# Patient Record
Sex: FEMALE | Race: ASIAN | Employment: OTHER | URBAN - METROPOLITAN AREA
[De-identification: names, ages, dates, MRNs, and addresses within clinical notes are randomized per-mention and may not be internally consistent; named-entity substitution may affect disease eponyms.]

---

## 2022-05-09 ENCOUNTER — HOSPITAL ENCOUNTER (OUTPATIENT)
Age: 72
Discharge: HOME | End: 2022-05-09
Payer: MEDICAID

## 2022-05-09 ENCOUNTER — HOSPITAL ENCOUNTER (OUTPATIENT)
Dept: GENERAL RADIOLOGY | Age: 72
Discharge: HOME | End: 2022-05-09
Payer: MEDICAID

## 2022-05-09 DIAGNOSIS — R52 PAIN: ICD-10-CM

## 2022-05-09 PROCEDURE — 73564 X-RAY EXAM KNEE 4 OR MORE: CPT

## 2022-06-13 ENCOUNTER — HOSPITAL ENCOUNTER (EMERGENCY)
Age: 72
Discharge: HOME | End: 2022-06-13
Attending: EMERGENCY MEDICINE
Payer: COMMERCIAL

## 2022-06-13 ENCOUNTER — APPOINTMENT (OUTPATIENT)
Dept: CT IMAGING | Age: 72
End: 2022-06-13
Payer: COMMERCIAL

## 2022-06-13 VITALS
HEART RATE: 94 BPM | RESPIRATION RATE: 18 BRPM | OXYGEN SATURATION: 95 % | TEMPERATURE: 98.5 F | DIASTOLIC BLOOD PRESSURE: 78 MMHG | SYSTOLIC BLOOD PRESSURE: 124 MMHG

## 2022-06-13 DIAGNOSIS — R10.84 GENERALIZED ABDOMINAL PAIN: ICD-10-CM

## 2022-06-13 DIAGNOSIS — R07.89 OTHER CHEST PAIN: Primary | ICD-10-CM

## 2022-06-13 DIAGNOSIS — E87.6 HYPOKALEMIA: ICD-10-CM

## 2022-06-13 LAB
A/G RATIO: 1.2 (ref 1.1–2.2)
ALBUMIN SERPL-MCNC: 4.5 G/DL (ref 3.4–5)
ALP BLD-CCNC: 171 U/L (ref 40–129)
ALT SERPL-CCNC: 28 U/L (ref 10–40)
ANION GAP SERPL CALCULATED.3IONS-SCNC: 12 MMOL/L (ref 3–16)
AST SERPL-CCNC: 26 U/L (ref 15–37)
BACTERIA: ABNORMAL /HPF
BASOPHILS ABSOLUTE: 0.2 K/UL (ref 0–0.2)
BASOPHILS RELATIVE PERCENT: 2.3 %
BILIRUB SERPL-MCNC: 0.3 MG/DL (ref 0–1)
BILIRUBIN URINE: NEGATIVE
BLOOD, URINE: NEGATIVE
BUN BLDV-MCNC: 7 MG/DL (ref 7–20)
CALCIUM SERPL-MCNC: 9.6 MG/DL (ref 8.3–10.6)
CHLORIDE BLD-SCNC: 101 MMOL/L (ref 99–110)
CLARITY: CLEAR
CO2: 26 MMOL/L (ref 21–32)
COLOR: YELLOW
CREAT SERPL-MCNC: 0.8 MG/DL (ref 0.6–1.2)
EKG ATRIAL RATE: 111 BPM
EKG DIAGNOSIS: NORMAL
EKG Q-T INTERVAL: 548 MS
EKG QRS DURATION: 66 MS
EKG QTC CALCULATION (BAZETT): 745 MS
EKG R AXIS: 75 DEGREES
EKG T AXIS: 146 DEGREES
EKG VENTRICULAR RATE: 111 BPM
EOSINOPHILS ABSOLUTE: 0.2 K/UL (ref 0–0.6)
EOSINOPHILS RELATIVE PERCENT: 1.8 %
EPITHELIAL CELLS, UA: 2 /HPF (ref 0–5)
GFR AFRICAN AMERICAN: >60
GFR NON-AFRICAN AMERICAN: >60
GLUCOSE BLD-MCNC: 159 MG/DL (ref 70–99)
GLUCOSE URINE: NEGATIVE MG/DL
HCT VFR BLD CALC: 41 % (ref 36–48)
HEMOGLOBIN: 14 G/DL (ref 12–16)
HYALINE CASTS: 0 /LPF (ref 0–8)
KETONES, URINE: NEGATIVE MG/DL
LACTIC ACID, SEPSIS: 2.3 MMOL/L (ref 0.4–1.9)
LACTIC ACID, SEPSIS: 2.7 MMOL/L (ref 0.4–1.9)
LEUKOCYTE ESTERASE, URINE: ABNORMAL
LIPASE: 46 U/L (ref 13–60)
LYMPHOCYTES ABSOLUTE: 3.7 K/UL (ref 1–5.1)
LYMPHOCYTES RELATIVE PERCENT: 35.7 %
MAGNESIUM: 2.2 MG/DL (ref 1.8–2.4)
MCH RBC QN AUTO: 27.7 PG (ref 26–34)
MCHC RBC AUTO-ENTMCNC: 34 G/DL (ref 31–36)
MCV RBC AUTO: 81.6 FL (ref 80–100)
MICROSCOPIC EXAMINATION: YES
MONOCYTES ABSOLUTE: 0.5 K/UL (ref 0–1.3)
MONOCYTES RELATIVE PERCENT: 5.1 %
NEUTROPHILS ABSOLUTE: 5.7 K/UL (ref 1.7–7.7)
NEUTROPHILS RELATIVE PERCENT: 55.1 %
NITRITE, URINE: NEGATIVE
PDW BLD-RTO: 14.4 % (ref 12.4–15.4)
PH UA: 7.5 (ref 5–8)
PLATELET # BLD: 372 K/UL (ref 135–450)
PMV BLD AUTO: 7.6 FL (ref 5–10.5)
POTASSIUM REFLEX MAGNESIUM: 2.8 MMOL/L (ref 3.5–5.1)
PRO-BNP: 45 PG/ML (ref 0–124)
PROTEIN UA: NEGATIVE MG/DL
RBC # BLD: 5.03 M/UL (ref 4–5.2)
RBC UA: 0 /HPF (ref 0–4)
SODIUM BLD-SCNC: 139 MMOL/L (ref 136–145)
SPECIFIC GRAVITY UA: 1.02 (ref 1–1.03)
TOTAL PROTEIN: 8.4 G/DL (ref 6.4–8.2)
TROPONIN: <0.01 NG/ML
TROPONIN: <0.01 NG/ML
URINE TYPE: ABNORMAL
UROBILINOGEN, URINE: 0.2 E.U./DL
WBC # BLD: 10.3 K/UL (ref 4–11)
WBC UA: 3 /HPF (ref 0–5)

## 2022-06-13 PROCEDURE — 96366 THER/PROPH/DIAG IV INF ADDON: CPT

## 2022-06-13 PROCEDURE — 83605 ASSAY OF LACTIC ACID: CPT

## 2022-06-13 PROCEDURE — 83880 ASSAY OF NATRIURETIC PEPTIDE: CPT

## 2022-06-13 PROCEDURE — 83735 ASSAY OF MAGNESIUM: CPT

## 2022-06-13 PROCEDURE — 99285 EMERGENCY DEPT VISIT HI MDM: CPT

## 2022-06-13 PROCEDURE — 81001 URINALYSIS AUTO W/SCOPE: CPT

## 2022-06-13 PROCEDURE — 85025 COMPLETE CBC W/AUTO DIFF WBC: CPT

## 2022-06-13 PROCEDURE — 96365 THER/PROPH/DIAG IV INF INIT: CPT

## 2022-06-13 PROCEDURE — 80053 COMPREHEN METABOLIC PANEL: CPT

## 2022-06-13 PROCEDURE — 6360000004 HC RX CONTRAST MEDICATION: Performed by: EMERGENCY MEDICINE

## 2022-06-13 PROCEDURE — 6360000002 HC RX W HCPCS: Performed by: EMERGENCY MEDICINE

## 2022-06-13 PROCEDURE — 74176 CT ABD & PELVIS W/O CONTRAST: CPT

## 2022-06-13 PROCEDURE — 71260 CT THORAX DX C+: CPT

## 2022-06-13 PROCEDURE — 36415 COLL VENOUS BLD VENIPUNCTURE: CPT

## 2022-06-13 PROCEDURE — 6370000000 HC RX 637 (ALT 250 FOR IP): Performed by: EMERGENCY MEDICINE

## 2022-06-13 PROCEDURE — 2580000003 HC RX 258: Performed by: EMERGENCY MEDICINE

## 2022-06-13 PROCEDURE — 83690 ASSAY OF LIPASE: CPT

## 2022-06-13 PROCEDURE — 84484 ASSAY OF TROPONIN QUANT: CPT

## 2022-06-13 PROCEDURE — 93005 ELECTROCARDIOGRAM TRACING: CPT | Performed by: EMERGENCY MEDICINE

## 2022-06-13 RX ORDER — POTASSIUM CHLORIDE 20 MEQ/1
20 TABLET, EXTENDED RELEASE ORAL 2 TIMES DAILY
Qty: 20 TABLET | Refills: 0 | Status: SHIPPED | OUTPATIENT
Start: 2022-06-13 | End: 2022-07-21

## 2022-06-13 RX ORDER — POTASSIUM CHLORIDE 20 MEQ/1
20 TABLET, EXTENDED RELEASE ORAL 2 TIMES DAILY
Qty: 20 TABLET | Refills: 0 | Status: SHIPPED | OUTPATIENT
Start: 2022-06-13 | End: 2022-06-13 | Stop reason: SDUPTHER

## 2022-06-13 RX ORDER — BUDESONIDE 0.25 MG/2ML
1 INHALANT ORAL 2 TIMES DAILY
COMMUNITY

## 2022-06-13 RX ORDER — AMLODIPINE BESYLATE 10 MG/1
10 TABLET ORAL DAILY
COMMUNITY

## 2022-06-13 RX ORDER — ATENOLOL 100 MG/1
50 TABLET ORAL DAILY
COMMUNITY

## 2022-06-13 RX ORDER — MULTIVIT-MIN/IRON/FOLIC ACID/K 18-600-40
CAPSULE ORAL
COMMUNITY

## 2022-06-13 RX ORDER — BENZONATATE 100 MG/1
100 CAPSULE ORAL 2 TIMES DAILY PRN
Qty: 20 CAPSULE | Refills: 0 | Status: SHIPPED | OUTPATIENT
Start: 2022-06-13 | End: 2022-06-20

## 2022-06-13 RX ORDER — POTASSIUM CHLORIDE 7.45 MG/ML
10 INJECTION INTRAVENOUS ONCE
Status: COMPLETED | OUTPATIENT
Start: 2022-06-13 | End: 2022-06-13

## 2022-06-13 RX ORDER — POTASSIUM CHLORIDE 20 MEQ/1
40 TABLET, EXTENDED RELEASE ORAL 2 TIMES DAILY WITH MEALS
Status: DISCONTINUED | OUTPATIENT
Start: 2022-06-13 | End: 2022-06-13 | Stop reason: HOSPADM

## 2022-06-13 RX ORDER — SODIUM CHLORIDE, SODIUM LACTATE, POTASSIUM CHLORIDE, AND CALCIUM CHLORIDE .6; .31; .03; .02 G/100ML; G/100ML; G/100ML; G/100ML
1000 INJECTION, SOLUTION INTRAVENOUS ONCE
Status: COMPLETED | OUTPATIENT
Start: 2022-06-13 | End: 2022-06-13

## 2022-06-13 RX ORDER — CETIRIZINE HYDROCHLORIDE 10 MG/1
10 TABLET ORAL DAILY
COMMUNITY

## 2022-06-13 RX ORDER — BENZONATATE 100 MG/1
100 CAPSULE ORAL 2 TIMES DAILY PRN
Qty: 20 CAPSULE | Refills: 0 | Status: SHIPPED | OUTPATIENT
Start: 2022-06-13 | End: 2022-06-13 | Stop reason: SDUPTHER

## 2022-06-13 RX ORDER — ATORVASTATIN CALCIUM 40 MG/1
40 TABLET, FILM COATED ORAL DAILY
COMMUNITY

## 2022-06-13 RX ADMIN — POTASSIUM CHLORIDE 10 MEQ: 7.46 INJECTION, SOLUTION INTRAVENOUS at 03:26

## 2022-06-13 RX ADMIN — SODIUM CHLORIDE, POTASSIUM CHLORIDE, SODIUM LACTATE AND CALCIUM CHLORIDE 1000 ML: 600; 310; 30; 20 INJECTION, SOLUTION INTRAVENOUS at 03:27

## 2022-06-13 RX ADMIN — IOPAMIDOL 75 ML: 755 INJECTION, SOLUTION INTRAVENOUS at 03:08

## 2022-06-13 RX ADMIN — POTASSIUM CHLORIDE 40 MEQ: 20 TABLET, EXTENDED RELEASE ORAL at 03:16

## 2022-06-13 NOTE — ED PROVIDER NOTES
[address] [facility] [facility]  eMERGENCY dEPARTMENT eNCOUnter        Pt Name: Amanda Perez  MRN: [id number]  [place] 1950  Date of evaluation: 6/13/2022  Provider: Tita Hall MD  PCP: No primary care provider on file. CHIEF COMPLAINT       No chief complaint on file. HISTORY OFPRESENT ILLNESS   (Location/Symptom, Timing/Onset, Context/Setting, Quality, Duration, Modifying Factors,Severity)  Note limiting factors. Amanda Perez is a 67 y.o. female brought in by the son the son provides interpretation and so does the [place]  patient has had chest pain for 3 days continuous with some shortness of breath also complains of lower abdominal pain no fever or chills no vomiting no diarrhea had a dry cough patient has a history of seasonal allergies hypertension hyperlipidemia    Nursing Notes were all reviewed and agreed with or any disagreements were addressed  in the HPI. REVIEW OF SYSTEMS    (2-9 systems for level 4, 10 or more for level 5)       REVIEW OF SYSTEMS    Constitutional:  Denies fever, chills, or weakness   Eyes:  Denies vision changes  HENT:  Denies sore throat or neck pain   Respiratory:   cough  shortness of breath   Cardiovascular:   chest pain  GI:   abdominal pain, no  nausea, vomiting, or diarrhea   Musculoskeletal:  Denies back pain   Skin: no rash or vesicles   Neurologic:  no headache weakness focal    Lymphatic:  no swollen  nodes      All systems negative except as marked. Positives and Pertinent negatives as per HPI. Except as noted above in the ROS, all other systems were reviewed andnegative. PASTMEDICAL HISTORY   No past medical history on file. SURGICAL HISTORY     No past surgical history on file.       CURRENT MEDICATIONS       Previous Medications    AMLODIPINE (NORVASC) 10 MG TABLET    Take 10 mg by mouth daily    ATENOLOL (TENORMIN) 100 MG TABLET    Take 50 mg by mouth daily    ATORVASTATIN (LIPITOR) 40 MG TABLET    Take 40 mg by mouth daily    BUDESONIDE (PULMICORT) 0.25 MG/2ML NEBULIZER SUSPENSION    Take 1 ampule by nebulization 2 times daily    CETIRIZINE (ZYRTEC) 10 MG TABLET    Take 10 mg by mouth daily    CHOLECALCIFEROL (VITAMIN D) 50 MCG (2000 UT) CAPS CAPSULE    Take by mouth       ALLERGIES     Patient has no allergy information on record. FAMILY HISTORY     No family history on file. SOCIAL HISTORY       Social History     Socioeconomic History    Marital status:      Spouse name: Not on file    Number of children: Not on file    Years of education: Not on file    Highest education level: Not on file   Occupational History    Not on file   Tobacco Use    Smoking status: Not on file    Smokeless tobacco: Not on file   Substance and Sexual Activity    Alcohol use: Not on file    Drug use: Not on file    Sexual activity: Not on file   Other Topics Concern    Not on file   Social History Narrative    Not on file     Social Determinants of Health     Financial Resource Strain:     Difficulty of Paying Living Expenses: Not on file   Food Insecurity:     Worried About Running Out of Food in the Last Year: Not on file    Anthony of Food in the Last Year: Not on file   Transportation Needs:     Lack of Transportation (Medical): Not on file    Lack of Transportation (Non-Medical):  Not on file   Physical Activity:     Days of Exercise per Week: Not on file    Minutes of Exercise per Session: Not on file   Stress:     Feeling of Stress : Not on file   Social Connections:     Frequency of Communication with Friends and Family: Not on file    Frequency of Social Gatherings with Friends and Family: Not on file    Attends Yazdanism Services: Not on file    Active Member of Clubs or Organizations: Not on file    Attends Club or Organization Meetings: Not on file    Marital Status: Not on file   Intimate Partner Violence:     Fear of Current or Ex-Partner: Not on file   Harriet Del Rosario Emotionally Abused: Not on file    Physically Abused: Not on file    Sexually Abused: Not on file   Housing Stability:     Unable to Pay for Housing in the Last Year: Not on file    Number of Places Lived in the Last Year: Not on file    Unstable Housing in the Last Year: Not on file       SCREENINGS             PHYSICAL EXAM    (up to 7 for level 4, 8 or more for level 5)     ED Triage Vitals   BP Temp Temp src Pulse Resp SpO2 Height Weight   -- -- -- -- -- -- -- --           General Appearance:  Alert, cooperative, no distress, appears stated age. Head:  Normocephalic, without obvious abnormality, atraumatic. Eyes:  conjunctiva/corneas clear, EOM's intact. Sclera anicteric. ENT: Mucous membranes moist.   Neck: Supple, symmetrical, trachea midline, no adenopathy. No jugular venous distention. Lungs:   No Respiratory Distress. no rales  rhonchi rub   Chest Wall:  Nontender  no deformity   Heart:  Rsr no murmer gallop    Abdomen:   Soft nontender no organomegally    Extremities:  Full range of motion. no deformity   Pulses: Equal  upper and lower    Skin:  No rashes or lesions to exposed skin. Neurologic: Alert and oriented X 3. Motor grossly normal.  Speech clear.        DIAGNOSTIC RESULTS   LABS:    Labs Reviewed   COMPREHENSIVE METABOLIC PANEL W/ REFLEX TO MG FOR LOW K - Abnormal; Notable for the following components:       Result Value    Potassium reflex Magnesium 2.8 (*)     Glucose 159 (*)     Total Protein 8.4 (*)     Alkaline Phosphatase 171 (*)     All other components within normal limits    Narrative:     Benjamin Bai  [facility] tel. [phone],  Chemistry results called to and read back by MICHAEL Gutierrez, 06/13/2022  02:44, by [facility]   LACTATE, SEPSIS - Abnormal; Notable for the following components:    Lactic Acid, Sepsis 2.7 (*)     All other components within normal limits   LACTATE, SEPSIS - Abnormal; Notable for the following components:    Lactic Acid, Sepsis 2.3 (*)     All other components within normal limits   URINALYSIS WITH MICROSCOPIC - Abnormal; Notable for the following components:    Leukocyte Esterase, Urine SMALL (*)     All other components within normal limits   CBC WITH AUTO DIFFERENTIAL   LIPASE    Narrative:     [facility] [phone],  Chemistry results called to and read back by MICHAEL Gutierrez, 06/13/2022  02:44, by [facility]   TROPONIN    Narrative:     Brent Dejesus  [facility] tel. [phone],  Chemistry results called to and read back by MICHAEL Gutierrez, 06/13/2022  02:44, by [address]    Narrative:     Andry Uriostegui. [phone],  Chemistry results called to and read back by MICHAEL Gutierrez, 06/13/2022  02:44, by [facility]    Narrative:     [facility] tel. [phone],  Chemistry results called to and read back by MICHAEL Gutierrez, 06/13/2022  02:44, by [facility]   TROPONIN       All other labs were within normal range or not returned as of thisdictation. EKG: All EKG's are interpreted by the Emergency Department Physician who either signs or Co-signs this chart in the absence of a cardiologist.    EKG Interpretation    Interpreted by emergency department physician    Rhythm: sinus tachycardia  Rate: 100-110  Axis: normal  Ectopy: none  Conduction: normal  ST Segments: no acute change  T Waves: no acute change  Q Waves: none    Clinical Impression: Sinus tachycardia    Love Huffman MD      RADIOLOGY:   Non-plain film images such as CT, Ultrasound and MRI are read by the radiologist. Plainradiographic images are visualized and preliminarily interpreted by the  ED Provider with the belowfindings:        Interpretation per the Radiologist below, if available at the time of this note:    CT ABDOMEN PELVIS WO CONTRAST Additional Contrast? None   Final Result   1. Gallbladder is contracted but there is a calcified stone at the   gallbladder neck. No intrahepatic biliary dilatation. 2.  Fatty metamorphosis within the liver. 3.  Normal enhancement and excretion of contrast at the kidneys. There is no   hydronephrosis or hydroureter. 4.  No appendicitis or secondary sign of colonic diverticulitis. CT CHEST PULMONARY EMBOLISM W CONTRAST   Final Result   No evidence of pulmonary embolism or acute pulmonary abnormality. PROCEDURES   Unless otherwise noted below, none     Procedures    CRITICAL CARE TIME   N/A      CONSULTS:  None    EMERGENCY DEPARTMENT COURSE and DIFFERENTIAL DIAGNOSIS/MDM:   Vitals:    Vitals:    06/13/22 0215 06/13/22 0245   BP:  124/78   Pulse:  94   Resp:  18   Temp: 98.5 °F (36.9 °C)    SpO2:  95%       Patient was given the following medications:  Medications   potassium chloride (KLOR-CON M) extended release tablet 40 mEq (40 mEq Oral Given 6/13/22 0316)   iopamidol (ISOVUE-370) 76 % injection 75 mL (75 mLs IntraVENous Given 6/13/22 0308)   lactated ringers bolus (0 mLs IntraVENous Stopped 6/13/22 0531)   potassium chloride 10 mEq/100 mL IVPB (Peripheral Line) (0 mEq IntraVENous Stopped 6/13/22 0531)       EKG troponins CT scan of the chest for PE CT of the abdomen unremarkable vital signs are stable patient was given Tessalon for cough potassium replacement and will follow up with primary care her initial lactate was 2.7 but came down to 2.3 after liter of fluid patient has no complaints of chest or abdominal pain at this time    Is this patient to be included in the SEP-1 Core Measure due to severe sepsis or septic shock? No   Exclusion criteria - the patient is NOT to be included for SEP-1 Core Measure due to: Infection is not suspected      The patient tolerated their visit well. Thepatient and / or the family were informed of the results of any tests, a time was given to answer questions. FINAL IMPRESSION      1. Other chest pain    2. Generalized abdominal pain    3.  Hypokalemia        DISPOSITION/PLAN   DISPOSITION Decision To Discharge 06/13/2022 03:52:55 AM      PATIENT REFERRED TO:  [facility]) Pre-Services  [phone]          DISCHARGE MEDICATIONS:  New Prescriptions    BENZONATATE (TESSALON) 100 MG CAPSULE    Take 1 capsule by mouth 2 times daily as needed for Cough    POTASSIUM CHLORIDE (KLOR-CON M) 20 MEQ EXTENDED RELEASE TABLET    Take 1 tablet by mouth 2 times daily for 7 days       DISCONTINUED MEDICATIONS:  Discontinued Medications    No medications on file              (Please note that portions of this note were completed with a voice recognition program.  Efforts were made to edit the dictations but occasionally words aremis-transcribed.)    Tita Hall MD (electronically signed)          Tita Hall MD  06/13/22 0018

## 2022-06-14 LAB
EKG ATRIAL RATE: 104 BPM
EKG DIAGNOSIS: NORMAL
EKG P AXIS: 72 DEGREES
EKG P-R INTERVAL: 202 MS
EKG Q-T INTERVAL: 338 MS
EKG QRS DURATION: 70 MS
EKG QTC CALCULATION (BAZETT): 444 MS
EKG R AXIS: 66 DEGREES
EKG T AXIS: 27 DEGREES
EKG VENTRICULAR RATE: 104 BPM

## 2022-06-14 PROCEDURE — 93010 ELECTROCARDIOGRAM REPORT: CPT | Performed by: INTERNAL MEDICINE

## 2022-07-21 ENCOUNTER — HOSPITAL ENCOUNTER (EMERGENCY)
Age: 72
Discharge: HOME | End: 2022-07-21
Attending: EMERGENCY MEDICINE
Payer: COMMERCIAL

## 2022-07-21 ENCOUNTER — APPOINTMENT (OUTPATIENT)
Dept: CT IMAGING | Age: 72
End: 2022-07-21
Payer: COMMERCIAL

## 2022-07-21 ENCOUNTER — APPOINTMENT (OUTPATIENT)
Dept: GENERAL RADIOLOGY | Age: 72
End: 2022-07-21
Payer: COMMERCIAL

## 2022-07-21 VITALS
DIASTOLIC BLOOD PRESSURE: 90 MMHG | WEIGHT: 151.5 LBS | SYSTOLIC BLOOD PRESSURE: 159 MMHG | RESPIRATION RATE: 21 BRPM | OXYGEN SATURATION: 98 % | TEMPERATURE: 97.9 F | HEART RATE: 79 BPM

## 2022-07-21 DIAGNOSIS — R11.2 NAUSEA AND VOMITING, INTRACTABILITY OF VOMITING NOT SPECIFIED, UNSPECIFIED VOMITING TYPE: Primary | ICD-10-CM

## 2022-07-21 DIAGNOSIS — E87.6 HYPOKALEMIA: ICD-10-CM

## 2022-07-21 LAB
A/G RATIO: 1.2 (ref 1.1–2.2)
ALBUMIN SERPL-MCNC: 4.3 G/DL (ref 3.4–5)
ALP BLD-CCNC: 162 U/L (ref 40–129)
ALT SERPL-CCNC: 24 U/L (ref 10–40)
ANION GAP SERPL CALCULATED.3IONS-SCNC: 13 MMOL/L (ref 3–16)
AST SERPL-CCNC: 22 U/L (ref 15–37)
BASOPHILS ABSOLUTE: 0.1 K/UL (ref 0–0.2)
BASOPHILS RELATIVE PERCENT: 1.1 %
BILIRUB SERPL-MCNC: 0.5 MG/DL (ref 0–1)
BUN BLDV-MCNC: 8 MG/DL (ref 7–20)
CALCIUM SERPL-MCNC: 9.6 MG/DL (ref 8.3–10.6)
CHLORIDE BLD-SCNC: 101 MMOL/L (ref 99–110)
CO2: 26 MMOL/L (ref 21–32)
CREAT SERPL-MCNC: 0.7 MG/DL (ref 0.6–1.2)
EOSINOPHILS ABSOLUTE: 0.2 K/UL (ref 0–0.6)
EOSINOPHILS RELATIVE PERCENT: 1.6 %
GFR AFRICAN AMERICAN: >60
GFR NON-AFRICAN AMERICAN: >60
GLUCOSE BLD-MCNC: 148 MG/DL (ref 70–99)
HCT VFR BLD CALC: 39.7 % (ref 36–48)
HEMOGLOBIN: 13.6 G/DL (ref 12–16)
LIPASE: 34 U/L (ref 13–60)
LYMPHOCYTES ABSOLUTE: 2.6 K/UL (ref 1–5.1)
LYMPHOCYTES RELATIVE PERCENT: 26.5 %
MCH RBC QN AUTO: 27.7 PG (ref 26–34)
MCHC RBC AUTO-ENTMCNC: 34.3 G/DL (ref 31–36)
MCV RBC AUTO: 80.8 FL (ref 80–100)
MONOCYTES ABSOLUTE: 0.5 K/UL (ref 0–1.3)
MONOCYTES RELATIVE PERCENT: 5.1 %
NEUTROPHILS ABSOLUTE: 6.4 K/UL (ref 1.7–7.7)
NEUTROPHILS RELATIVE PERCENT: 65.7 %
PDW BLD-RTO: 14.3 % (ref 12.4–15.4)
PLATELET # BLD: 392 K/UL (ref 135–450)
PMV BLD AUTO: 7.9 FL (ref 5–10.5)
POTASSIUM SERPL-SCNC: 2.8 MMOL/L (ref 3.5–5.1)
PRO-BNP: 142 PG/ML (ref 0–124)
RBC # BLD: 4.92 M/UL (ref 4–5.2)
SODIUM BLD-SCNC: 140 MMOL/L (ref 136–145)
TOTAL PROTEIN: 7.9 G/DL (ref 6.4–8.2)
TROPONIN: <0.01 NG/ML
WBC # BLD: 9.8 K/UL (ref 4–11)

## 2022-07-21 PROCEDURE — 36415 COLL VENOUS BLD VENIPUNCTURE: CPT

## 2022-07-21 PROCEDURE — 93005 ELECTROCARDIOGRAM TRACING: CPT | Performed by: NURSE PRACTITIONER

## 2022-07-21 PROCEDURE — 80053 COMPREHEN METABOLIC PANEL: CPT

## 2022-07-21 PROCEDURE — 84484 ASSAY OF TROPONIN QUANT: CPT

## 2022-07-21 PROCEDURE — 83690 ASSAY OF LIPASE: CPT

## 2022-07-21 PROCEDURE — 83880 ASSAY OF NATRIURETIC PEPTIDE: CPT

## 2022-07-21 PROCEDURE — 70450 CT HEAD/BRAIN W/O DYE: CPT

## 2022-07-21 PROCEDURE — 99285 EMERGENCY DEPT VISIT HI MDM: CPT

## 2022-07-21 PROCEDURE — 74176 CT ABD & PELVIS W/O CONTRAST: CPT

## 2022-07-21 PROCEDURE — 71045 X-RAY EXAM CHEST 1 VIEW: CPT

## 2022-07-21 PROCEDURE — 6370000000 HC RX 637 (ALT 250 FOR IP): Performed by: NURSE PRACTITIONER

## 2022-07-21 PROCEDURE — 85025 COMPLETE CBC W/AUTO DIFF WBC: CPT

## 2022-07-21 RX ORDER — ONDANSETRON 4 MG/1
4 TABLET, ORALLY DISINTEGRATING ORAL 3 TIMES DAILY PRN
Qty: 21 TABLET | Refills: 0 | Status: SHIPPED | OUTPATIENT
Start: 2022-07-21

## 2022-07-21 RX ORDER — ONDANSETRON 4 MG/1
4 TABLET, ORALLY DISINTEGRATING ORAL ONCE
Status: COMPLETED | OUTPATIENT
Start: 2022-07-21 | End: 2022-07-21

## 2022-07-21 RX ORDER — POTASSIUM CHLORIDE 20 MEQ/1
20 TABLET, EXTENDED RELEASE ORAL 2 TIMES DAILY
Qty: 20 TABLET | Refills: 0 | Status: SHIPPED | OUTPATIENT
Start: 2022-07-21 | End: 2022-07-28

## 2022-07-21 RX ADMIN — ONDANSETRON 4 MG: 4 TABLET, ORALLY DISINTEGRATING ORAL at 21:07

## 2022-07-21 ASSESSMENT — ENCOUNTER SYMPTOMS
SHORTNESS OF BREATH: 0
VOMITING: 1
ABDOMINAL DISTENTION: 1
NAUSEA: 1
ABDOMINAL PAIN: 1
DIARRHEA: 1
CHEST TIGHTNESS: 0

## 2022-07-22 LAB
EKG ATRIAL RATE: 78 BPM
EKG DIAGNOSIS: NORMAL
EKG P AXIS: 60 DEGREES
EKG P-R INTERVAL: 192 MS
EKG Q-T INTERVAL: 398 MS
EKG QRS DURATION: 70 MS
EKG QTC CALCULATION (BAZETT): 453 MS
EKG R AXIS: 70 DEGREES
EKG T AXIS: 38 DEGREES
EKG VENTRICULAR RATE: 78 BPM

## 2022-07-22 PROCEDURE — 93010 ELECTROCARDIOGRAM REPORT: CPT | Performed by: INTERNAL MEDICINE

## 2022-07-22 NOTE — ED PROVIDER NOTES
In addition to the advanced practice provider, I personally saw Virgen Castrejon and performed a substantive portion of the visit including all aspects of the medical decision making. Medical Decision Making  72yoF, here with 2d of n/v/d  No fever, chills sweats  On exam, WDWNF, NAD, Trace BLE edema. Heart RRR, Lungs CTAB. Workup shows hypokalemia (chronic), and some gallstones, but no cholecystitis. Replete K  F/u PCP. SEP-1  Is this patient to be included in the SEP-1 Core Measure due to severe sepsis or septic shock? No   Exclusion criteria - the patient is NOT to be included for SEP-1 Core Measure due to: Infection is not suspected    Screenings  NIH Stroke Scale  Interval: Baseline  Level of Consciousness (1a): Alert  LOC Questions (1b): Answers both correctly  LOC Commands (1c): Performs both tasks correctly  Best Gaze (2): Normal  Visual (3): No visual loss  Facial Palsy (4): Normal symmetrical movement  Motor Arm, Left (5a): No drift  Motor Arm, Right (5b): No drift  Motor Leg, Left (6a): No drift  Motor Leg, Right (6b): No drift  Limb Ataxia (7): Absent  Sensory (8): Normal  Best Language (9): No aphasia  Dysarthria (10): Normal  Extinction and Inattention (11): No abnormality  Total: 0  [place] Coma Scale  Eye Opening: Spontaneous  Best Verbal Response: Oriented  Best Motor Response: Obeys commands  Shae Coma Scale Score: 15             [facility]  [facility]  [phone]        Discharge Medications:  Discharge Medication List as of 7/21/2022 10:58 PM        START taking these medications    Details   ondansetron (ZOFRAN-ODT) 4 MG disintegrating tablet Take 1 tablet by mouth 3 times daily as needed for Nausea or Vomiting, Disp-21 tablet, R-0Normal             FINAL IMPRESSION  1. Nausea and vomiting, intractability of vomiting not specified, unspecified vomiting type    2.  Hypokalemia        Blood pressure (!) 159/90, pulse 79, temperature 97.9 °F (36.6 °C), temperature source Oral, resp. rate 21, weight 151 lb 8 oz (68.7 kg), SpO2 98 %. For further details of [facility]'s emergency department encounter, please see documentation by advanced practice provider, Kristina Pulido.        Thomas Boothe MD  07/22/22 0109

## 2022-07-22 NOTE — ED PROVIDER NOTES
Triage EKG:    The 12 lead EKG was interpreted by me as follows:  Rate: normal with a rate of 78  Rhythm: sinus with sinus arrhythmia  Axis: normal  Intervals: normal NH, narrow QRS, normal QTc  ST segments: no ST elevations or depressions  T waves: no abnormal inversions  Non-specific T wave changes: not present  Prior EKG comparison: EKG dated 6/13/22 is not significantly different        Petros Mccullough MD  07/21/22 6484

## 2022-07-22 NOTE — ED PROVIDER NOTES
[address] [facility]        Pt Name: Virgen Castrejon  MRN: [id number]  [place] 1950  Date of evaluation: 7/21/2022  Provider: IVANA Nguyen CNP  PCP: No primary care provider on file. Note Started: 9:07 PM EDT        I have seen and evaluated this patient with my supervising physician [address] [place] [address]       Chief Complaint   Patient presents with    Emesis     Pt states she is having vomting, dizziness, and diarrhea since 1400 today. HISTORY OF PRESENT ILLNESS   (Location, Timing/Onset, Context/Setting, Quality, Duration, Modifying Factors, Severity, Associated Signs and Symptoms)  Note limiting factors. Chief Complaint: hayde Castrejon is a 67 y.o. female who presents to the emergency department this evening with multiple complaints. Specifically complaining of dizziness, nausea, vomiting, diarrhea, abdominal pain, and bilateral leg swelling. Reports dizziness, nausea, vomiting, and diarrhea ongoing for several days. No mitigating exacerbating factors. Tamazight  used throughout visit. Denies any headache, fever, lightheadedness, visual disturbances. No chest pain or pressure. No neck or back pain. No shortness of breath, cough, or congestion. No constipation, or dysuria. No rash. Nursing Notes were all reviewed and agreed with or any disagreements were addressed in the HPI. REVIEW OF SYSTEMS    (2-9 systems for level 4, 10 or more for level 5)     Review of Systems   Constitutional:  Negative for activity change, chills and fever. Respiratory:  Negative for chest tightness and shortness of breath. Cardiovascular:  Positive for leg swelling. Negative for chest pain. Gastrointestinal:  Positive for abdominal distention, abdominal pain, diarrhea, nausea and vomiting. Genitourinary:  Negative for dysuria. Neurological:  Positive for dizziness.    All other systems reviewed and are negative. Positives and Pertinent negatives as per HPI. Except as noted above in the ROS, all other systems were reviewed and negative. PAST MEDICAL HISTORY   History reviewed. No pertinent past medical history. SURGICAL HISTORY   History reviewed. No pertinent surgical history. [address]       Discharge Medication List as of 7/21/2022 10:58 PM        CONTINUE these medications which have NOT CHANGED    Details   budesonide (PULMICORT) 0.25 MG/2ML nebulizer suspension Take 1 ampule by nebulization 2 times dailyHistorical Med      Cholecalciferol (VITAMIN D) 50 MCG (2000 UT) CAPS capsule Take by mouthHistorical Med      atenolol (TENORMIN) 100 MG tablet Take 50 mg by mouth dailyHistorical Med      atorvastatin (LIPITOR) 40 MG tablet Take 40 mg by mouth dailyHistorical Med      amLODIPine (NORVASC) 10 MG tablet Take 10 mg by mouth dailyHistorical Med      cetirizine (ZYRTEC) 10 MG tablet Take 10 mg by mouth dailyHistorical Med               ALLERGIES     Patient has no known allergies. FAMILYHISTORY     History reviewed. No pertinent family history. SOCIAL HISTORY          SCREENINGS   NIH Stroke Scale  Interval: Baseline  Level of Consciousness (1a): Alert  LOC Questions (1b): Answers both correctly  LOC Commands (1c): Performs both tasks correctly  Best Gaze (2): Normal  Visual (3): No visual loss  Facial Palsy (4): Normal symmetrical movement  Motor Arm, Left (5a): No drift  Motor Arm, Right (5b): No drift  Motor Leg, Left (6a): No drift  Motor Leg, Right (6b):  No drift  Limb Ataxia (7): Absent  Sensory (8): Normal  Best Language (9): No aphasia  Dysarthria (10): Normal  Extinction and Inattention (11): No abnormality  Total: 0[place] Coma Scale  Eye Opening: Spontaneous  Best Verbal Response: Oriented  Best Motor Response: Obeys commands  Shae Coma Scale Score: 15        PHYSICAL EXAM    (up to 7 for level 4, 8 or more for level 5)     ED Triage Vitals [07/21/22 2036]   BP Temp Temp Source Heart Rate Resp SpO2 Height Weight   (!) 159/90 97.9 °F (36.6 °C) Oral 79 21 98 % -- 151 lb 8 oz (68.7 kg)       Physical Exam  Vitals and nursing note reviewed. Constitutional:       Appearance: She is well-developed. She is not diaphoretic. HENT:      Head: Normocephalic and atraumatic. Right Ear: External ear normal.      Left Ear: External ear normal.   Eyes:      General:         Right eye: No discharge. Left eye: No discharge. Neck:      Vascular: No JVD. Cardiovascular:      Rate and Rhythm: Normal rate. Pulses: Normal pulses. Heart sounds: Normal heart sounds. Pulmonary:      Effort: Pulmonary effort is normal. No respiratory distress. Breath sounds: Normal breath sounds. Abdominal:      Tenderness: There is abdominal tenderness. Musculoskeletal:         General: Normal range of motion. Right lower leg: Edema present. Left lower leg: Edema present. Skin:     General: Skin is warm and dry. Coloration: Skin is not pale. Neurological:      Mental Status: She is alert. Cranial Nerves: Cranial nerves are intact. Sensory: Sensation is intact. Motor: Motor function is intact. Coordination: Coordination is intact. Gait: Gait is intact.    Psychiatric:         Behavior: Behavior normal.       DIAGNOSTIC RESULTS   LABS:    Labs Reviewed   COMPREHENSIVE METABOLIC PANEL - Abnormal; Notable for the following components:       Result Value    Potassium 2.8 (*)     Glucose 148 (*)     Alkaline Phosphatase 162 (*)     All other components within normal limits    Narrative:     CALL  [facility] tel. [phone],  Chemistry results called to and read back by MICHAEL Kim, 07/21/2022  21:56, by [address] [place] [address] - Abnormal; Notable for the following components:    Pro- (*)     All other components within normal limits    Narrative:     Yadi Pichardo  [facility] tel. [phone],  Chemistry results called to and read back by MICHAEL Ochoa Asa, 07/21/2022  21:56, by [facility]   CBC WITH AUTO DIFFERENTIAL   TROPONIN    Narrative:     Fidel Lopez  [facility] tel. [phone],  Chemistry results called to and read back by MICHAEL Ochoa Asa, 07/21/2022  21:56, by Gabriela Elizondo    Narrative:     Fidel Lopez  [facility] tel. [phone],  Chemistry results called to and read back by MICHAEL Ochoa Asa, 07/21/2022  21:56, by [facility]       When ordered only abnormal lab results are displayed. All other labs were within normal range or not returned as of this dictation. EKG: When ordered, EKG's are interpreted by the Emergency Department Physician in the absence of a cardiologist.  Please see their note for interpretation of EKG. RADIOLOGY:   Non-plain film images such as CT, Ultrasound and MRI are read by the radiologist. Plain radiographic images are visualized and preliminarily interpreted by the ED Provider with the below findings:        Interpretation per the Radiologist below, if available at the time of this note:    CT ABDOMEN PELVIS WO CONTRAST Additional Contrast? None   Final Result      1. Gallstone within an otherwise unremarkable appearing gallbladder. 2.  Hypodensity of the liver parenchyma suggests fatty infiltration though   other forms of intrinsic liver disease are not excluded. No focal liver   lesion or bile duct dilation is noted, given limitations due to the lack of   intravenous contrast.      3.  A normal appearing appendix is noted. 4.  Otherwise no acute pathology or significant change noted. CT HEAD WO CONTRAST   Final Result      1. No evidence of acute intracranial process. 2.  Findings of presumed mild small vessel ischemic deep white matter disease. XR CHEST PORTABLE   Final Result   Questionable left atrial enlargement which may just be artifactual.   Recommend a follow-up PA and lateral chest.      No acute pulmonary abnormality seen. No results found. PROCEDURES   Unless otherwise noted below, none     Procedures    CRITICAL CARE TIME       CONSULTS:  None      EMERGENCY DEPARTMENT COURSE and DIFFERENTIAL DIAGNOSIS/MDM:   Vitals:    Vitals:    07/21/22 2036   BP: (!) 159/90   Pulse: 79   Resp: 21   Temp: 97.9 °F (36.6 °C)   TempSrc: Oral   SpO2: 98%   Weight: 151 lb 8 oz (68.7 kg)       Patient was given the following medications:  Medications   ondansetron (ZOFRAN-ODT) disintegrating tablet 4 mg (4 mg Oral Given 7/21/22 2107)         Is this patient to be included in the SEP-1 Core Measure due to severe sepsis or septic shock? No   Exclusion criteria - the patient is NOT to be included for SEP-1 Core Measure due to: Infection is not suspected    Briefly, this is a 67-year-old female with unknown medical history the presents to the emergency department with complaint of 2 days of dizziness with nausea, vomiting, and diarrhea. Also complaining of bilateral lower leg swelling and abdominal pain. CBC is unremarkable. CMP shows potassium of 2.8, patient has had hypokalemia previously. Instructed to follow-up with primary care regarding this problem. CMP is otherwise unremarkable. Troponin <0.01. . Lipase 34. CT ABDOMEN PELVIS WO CONTRAST Additional Contrast? None (Final result)  Result time 07/21/22 21:19:07  Final result by Darius Paz MD (07/21/22 21:19:07)                Impression:      1. Gallstone within an otherwise unremarkable appearing gallbladder. 2.  Hypodensity of the liver parenchyma suggests fatty infiltration though   other forms of intrinsic liver disease are not excluded. No focal liver   lesion or bile duct dilation is noted, given limitations due to the lack of   intravenous contrast.     3.  A normal appearing appendix is noted. 4.  Otherwise no acute pathology or significant change noted.                CT HEAD WO CONTRAST (Final result)  Result time 07/21/22 21:16:42  Final result by aDrius Paz

## 2022-07-22 NOTE — ED NOTES
Pt d/c'd home. Reviewed d/c instructions, home meds, and  f/u information utilizing teach-back method. Scripts for zofran and potassium given to patient. Patient verbalized understanding using interpretor [phone].         John Castaneda RN  07/21/22 5413